# Patient Record
Sex: MALE | Race: OTHER
[De-identification: names, ages, dates, MRNs, and addresses within clinical notes are randomized per-mention and may not be internally consistent; named-entity substitution may affect disease eponyms.]

---

## 2018-07-31 ENCOUNTER — HOSPITAL ENCOUNTER (EMERGENCY)
Dept: HOSPITAL 8 - ED | Age: 53
Discharge: HOME | End: 2018-07-31
Payer: COMMERCIAL

## 2018-07-31 VITALS — SYSTOLIC BLOOD PRESSURE: 152 MMHG | DIASTOLIC BLOOD PRESSURE: 88 MMHG

## 2018-07-31 VITALS — BODY MASS INDEX: 26.99 KG/M2 | WEIGHT: 171.96 LBS | HEIGHT: 67 IN

## 2018-07-31 DIAGNOSIS — S01.112A: Primary | ICD-10-CM

## 2018-07-31 DIAGNOSIS — R55: ICD-10-CM
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
## 2018-07-31 DIAGNOSIS — G20: ICD-10-CM

## 2018-07-31 LAB
ALBUMIN SERPL-MCNC: 3.6 G/DL (ref 3.4–5)
ANION GAP SERPL CALC-SCNC: 6 MMOL/L (ref 5–15)
BASOPHILS # BLD AUTO: 0.07 X10^3/UL (ref 0–0.1)
BASOPHILS NFR BLD AUTO: 1 % (ref 0–1)
CALCIUM SERPL-MCNC: 8.9 MG/DL (ref 8.5–10.1)
CHLORIDE SERPL-SCNC: 101 MMOL/L (ref 98–107)
CREAT SERPL-MCNC: 1.21 MG/DL (ref 0.7–1.3)
CULTURE INDICATED?: NO
EOSINOPHIL # BLD AUTO: 0.21 X10^3/UL (ref 0–0.4)
EOSINOPHIL NFR BLD AUTO: 3 % (ref 1–7)
ERYTHROCYTE [DISTWIDTH] IN BLOOD BY AUTOMATED COUNT: 13.8 % (ref 9.4–14.8)
LYMPHOCYTES # BLD AUTO: 1.14 X10^3/UL (ref 1–3.4)
LYMPHOCYTES NFR BLD AUTO: 17 % (ref 22–44)
MCH RBC QN AUTO: 31.1 PG (ref 27.5–34.5)
MCHC RBC AUTO-ENTMCNC: 34.1 G/DL (ref 33.2–36.2)
MCV RBC AUTO: 91.4 FL (ref 81–97)
MD: NO
MICROSCOPIC: (no result)
MONOCYTES # BLD AUTO: 0.43 X10^3/UL (ref 0.2–0.8)
MONOCYTES NFR BLD AUTO: 6 % (ref 2–9)
NEUTROPHILS # BLD AUTO: 5.07 X10^3/UL (ref 1.8–6.8)
NEUTROPHILS NFR BLD AUTO: 73 % (ref 42–75)
PLATELET # BLD AUTO: 217 X10^3/UL (ref 130–400)
PMV BLD AUTO: 7.5 FL (ref 7.4–10.4)
RBC # BLD AUTO: 4.72 X10^6/UL (ref 4.38–5.82)
TROPONIN I SERPL-MCNC: < 0.015 NG/ML (ref 0–0.04)

## 2018-07-31 PROCEDURE — 99285 EMERGENCY DEPT VISIT HI MDM: CPT

## 2018-07-31 PROCEDURE — 93005 ELECTROCARDIOGRAM TRACING: CPT

## 2018-07-31 PROCEDURE — 36415 COLL VENOUS BLD VENIPUNCTURE: CPT

## 2018-07-31 PROCEDURE — 70450 CT HEAD/BRAIN W/O DYE: CPT

## 2018-07-31 PROCEDURE — 81003 URINALYSIS AUTO W/O SCOPE: CPT

## 2018-07-31 PROCEDURE — 85025 COMPLETE CBC W/AUTO DIFF WBC: CPT

## 2018-07-31 PROCEDURE — 82040 ASSAY OF SERUM ALBUMIN: CPT

## 2018-07-31 PROCEDURE — 80048 BASIC METABOLIC PNL TOTAL CA: CPT

## 2018-07-31 PROCEDURE — 84484 ASSAY OF TROPONIN QUANT: CPT

## 2018-07-31 PROCEDURE — 12051 INTMD RPR FACE/MM 2.5 CM/<: CPT

## 2018-08-06 ENCOUNTER — HOSPITAL ENCOUNTER (EMERGENCY)
Dept: HOSPITAL 8 - ED | Age: 53
Discharge: HOME | End: 2018-08-06
Payer: COMMERCIAL

## 2018-08-06 VITALS — HEIGHT: 67 IN | WEIGHT: 158.73 LBS | BODY MASS INDEX: 24.91 KG/M2

## 2018-08-06 VITALS — SYSTOLIC BLOOD PRESSURE: 133 MMHG | DIASTOLIC BLOOD PRESSURE: 80 MMHG
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
## 2018-08-06 DIAGNOSIS — S01.112D: Primary | ICD-10-CM

## 2018-08-06 PROCEDURE — 99282 EMERGENCY DEPT VISIT SF MDM: CPT

## 2020-06-09 ENCOUNTER — HOSPITAL ENCOUNTER (OUTPATIENT)
Dept: HOSPITAL 8 - CFH | Age: 55
Discharge: HOME | End: 2020-06-09
Attending: INTERNAL MEDICINE
Payer: COMMERCIAL

## 2020-06-09 DIAGNOSIS — R07.89: ICD-10-CM

## 2020-06-09 DIAGNOSIS — I35.1: Primary | ICD-10-CM

## 2020-06-09 DIAGNOSIS — I10: ICD-10-CM

## 2020-06-09 PROCEDURE — 93306 TTE W/DOPPLER COMPLETE: CPT

## 2020-06-09 PROCEDURE — 93017 CV STRESS TEST TRACING ONLY: CPT

## 2020-06-09 PROCEDURE — 78452 HT MUSCLE IMAGE SPECT MULT: CPT

## 2020-06-09 PROCEDURE — A9502 TC99M TETROFOSMIN: HCPCS

## 2020-12-22 ENCOUNTER — TELEPHONE (OUTPATIENT)
Dept: SCHEDULING | Facility: IMAGING CENTER | Age: 55
End: 2020-12-22

## 2021-01-25 ENCOUNTER — TELEPHONE (OUTPATIENT)
Dept: MEDICAL GROUP | Facility: PHYSICIAN GROUP | Age: 56
End: 2021-01-25

## 2021-01-25 RX ORDER — METOPROLOL SUCCINATE 50 MG/1
50 TABLET, EXTENDED RELEASE ORAL
COMMUNITY
Start: 2020-11-24 | End: 2021-05-25

## 2021-01-25 NOTE — TELEPHONE ENCOUNTER
NEW PATIENT VISIT PRE-VISIT PLANNING    1.  EpicCare Patient is checked in Patient Demographics?Yes    2.  Immunizations were updated in Epic using Reconcile Outside Information activity? Yes         3.  Is this appointment scheduled as a Hospital Follow-Up? No    4.  Patient is due for the following Health Maintenance Topics:   Health Maintenance Due   Topic Date Due   • HEPATITIS C SCREENING  1965   • COLONOSCOPY  08/28/2014   • IMM ZOSTER VACCINES (1 of 2) 08/28/2014   • IMM INFLUENZA (1) 09/01/2020       - Patient has completed Colonoscopy/FIT and HMR Letter faxed to:  [facility] .    5.  Reviewed/Updated the following with patient:       •   Preferred Pharmacy? Yes       •   Preferred Lab? Yes       •   Preferred Communication? Yes       •   Allergies? Yes       •   Medications? YES. Was Abstract Encounter opened and chart updated? YES       •   Social History? Yes       •   Family History (document living status of immediate family members and if + hx of  cancer, diabetes, hypertension, hyperlipidemia, heart attack, stroke) Yes    6.  Updated Care Team?       •   DME Company (gait device, O2, CPAP, etc.) N\A       •   Other Specialists (eye doctor, derm, GYN, cardiology, endo, etc): N\A    7.  AHA (Puls8) form printed for Provider? N/A

## 2021-01-25 NOTE — LETTER
[facility]  Remedios Anderson M.D.  [address]  [place] [address]  Fax: [phone]   Authorization for Release/Disclosure of   Protected Health Information   Name: VANCE ALCARAZ : 1965 SSN: [id number]   Address: [address] [place] [address] [address]  [place] [address] Phone:    [phone] (home)    I authorize the entity listed below to release/disclose the PHI below to:   [facility]/Remedios Anderson M.D. and Remedios Anderson M.D.   Provider or Entity Name:  [facility]    Address   City, State, [facility]   Phone:      Fax:  [phone]   Reason for request: continuity of care   Information to be released:    [  ] LAST COLONOSCOPY,  including any PATH REPORT and follow-up  [  ] LAST FIT/COLOGUARD RESULT [  ] LAST DEXA  [  ] LAST MAMMOGRAM  [  ] LAST PAP  [  ] LAST LABS [  ] RETINA EXAM REPORT  [  ] IMMUNIZATION RECORDS  [xxx  ] Release all info      [  ] Check here and initial the line next to each item to release ALL health information INCLUDING  _____ Care and treatment for drug and / or alcohol abuse  _____ HIV testing, infection status, or AIDS  _____ Genetic Testing    DATES OF SERVICE OR TIME PERIOD TO BE DISCLOSED: _____________  I understand and acknowledge that:  * This Authorization may be revoked at any time by you in writing, except if your health information has already been used or disclosed.  * Your health information that will be used or disclosed as a result of you signing this authorization could be re-disclosed by the recipient. If this occurs, your re-disclosed health information may no longer be protected by State or Federal laws.  * You may refuse to sign this Authorization. Your refusal will not affect your ability to obtain treatment.  * This Authorization becomes effective upon signing and will  on (date) __________.      If no date is indicated, this Authorization will  one (1) year from the signature date.    Name: Vance Alcaraz       Date:     2021            PLEASE FAX REQUESTED RECORDS BACK TO: [phone]

## 2021-01-26 ENCOUNTER — OFFICE VISIT (OUTPATIENT)
Dept: MEDICAL GROUP | Facility: PHYSICIAN GROUP | Age: 56
End: 2021-01-26
Payer: COMMERCIAL

## 2021-01-26 VITALS
HEIGHT: 67 IN | SYSTOLIC BLOOD PRESSURE: 128 MMHG | HEART RATE: 78 BPM | BODY MASS INDEX: 26.23 KG/M2 | OXYGEN SATURATION: 93 % | WEIGHT: 167.11 LBS | DIASTOLIC BLOOD PRESSURE: 80 MMHG | TEMPERATURE: 96.7 F

## 2021-01-26 DIAGNOSIS — G20.A1 PARKINSON DISEASE (HCC): ICD-10-CM

## 2021-01-26 DIAGNOSIS — R60.0 BILATERAL LOWER EXTREMITY EDEMA: ICD-10-CM

## 2021-01-26 DIAGNOSIS — Z12.5 SCREENING FOR PROSTATE CANCER: ICD-10-CM

## 2021-01-26 DIAGNOSIS — I10 BENIGN ESSENTIAL HTN: ICD-10-CM

## 2021-01-26 DIAGNOSIS — R73.03 PREDIABETES: ICD-10-CM

## 2021-01-26 PROCEDURE — 99204 OFFICE O/P NEW MOD 45 MIN: CPT | Performed by: FAMILY MEDICINE

## 2021-01-26 ASSESSMENT — PATIENT HEALTH QUESTIONNAIRE - PHQ9: CLINICAL INTERPRETATION OF PHQ2 SCORE: 0

## 2021-01-26 NOTE — LETTER
[facility]  Remedios Anderson M.D.  [address]  [place] [address]  Fax: [phone]   Authorization for Release/Disclosure of   Protected Health Information   Name: VANCE ALCARAZ : 1965 SSN: [id number]   Address: [address] [place] [address] [address]  [place] [address] Phone:    [phone] (home)    I authorize the entity listed below to release/disclose the PHI below to:   [facility]/Remedios Anderson M.D. and Remedios Anderson M.D.   Provider or Entity Name:    [facility]   Address   City, State, Zip   Phone:      Fax:     Reason for request: continuity of care   Information to be released:    [ x ] LAST COLONOSCOPY,  including any PATH REPORT and follow-up  [  ] LAST FIT/COLOGUARD RESULT [  ] LAST DEXA  [  ] LAST MAMMOGRAM  [  ] LAST PAP  [  ] LAST LABS [  ] RETINA EXAM REPORT  [  ] IMMUNIZATION RECORDS  [  ] Release all info      [  ] Check here and initial the line next to each item to release ALL health information INCLUDING  _____ Care and treatment for drug and / or alcohol abuse  _____ HIV testing, infection status, or AIDS  _____ Genetic Testing    DATES OF SERVICE OR TIME PERIOD TO BE DISCLOSED: _____________  I understand and acknowledge that:  * This Authorization may be revoked at any time by you in writing, except if your health information has already been used or disclosed.  * Your health information that will be used or disclosed as a result of you signing this authorization could be re-disclosed by the recipient. If this occurs, your re-disclosed health information may no longer be protected by State or Federal laws.  * You may refuse to sign this Authorization. Your refusal will not affect your ability to obtain treatment.  * This Authorization becomes effective upon signing and will  on (date) __________.      If no date is indicated, this Authorization will  one (1) year from the signature date.    Name: Vance Alcaraz    Signature:   Date:     2021       PLEASE FAX REQUESTED RECORDS BACK TO: [phone]

## 2021-01-26 NOTE — LETTER
[facility]  Remedios Anderson M.D.  [address]  [place] [address]  Fax: [phone]   Authorization for Release/Disclosure of   Protected Health Information   Name: VANCE ALCARAZ : 1965 SSN: [id number]   Address: [address] [place] [address] [address]  [place] [address] Phone:    [phone] (home)    I authorize the entity listed below to release/disclose the PHI below to:   [facility]/Remedios Anderson M.D. and Remedios Anderson M.D.   Provider or Entity Name:    Dr. Rinaldi-Neurologist   Address   City, [facility], [facility]   Phone:      Fax:     Reason for request: continuity of care   Information to be released:    [  ] LAST COLONOSCOPY,  including any PATH REPORT and follow-up  [  ] LAST FIT/COLOGUARD RESULT [  ] LAST DEXA  [  ] LAST MAMMOGRAM  [  ] LAST PAP  [  ] LAST LABS [  ] RETINA EXAM REPORT  [  ] IMMUNIZATION RECORDS  [  ] Release all info      [  ] Check here and initial the line next to each item to release ALL health information INCLUDING  _____ Care and treatment for drug and / or alcohol abuse  _____ HIV testing, infection status, or AIDS  _____ Genetic Testing    DATES OF SERVICE OR TIME PERIOD TO BE DISCLOSED: _____________  I understand and acknowledge that:  * This Authorization may be revoked at any time by you in writing, except if your health information has already been used or disclosed.  * Your health information that will be used or disclosed as a result of you signing this authorization could be re-disclosed by the recipient. If this occurs, your re-disclosed health information may no longer be protected by State or Federal laws.  * You may refuse to sign this Authorization. Your refusal will not affect your ability to obtain treatment.  * This Authorization becomes effective upon signing and will  on (date) __________.      If no date is indicated, this Authorization will  one (1) year from the signature date.    Name: Vance Alcaraz    Signature:   Date:      1/26/2021       PLEASE FAX REQUESTED RECORDS BACK TO: [phone]

## 2021-01-26 NOTE — LETTER
[facility]  Remedios Anderson M.D.  [address]  [place] [address]  Fax: [phone]   Authorization for Release/Disclosure of   Protected Health Information   Name: VANCE ALCARAZ : 1965 SSN: [id number]   Address: [address] [place] [address] [address]  [place] [address] Phone:    [phone] (home)    I authorize the entity listed below to release/disclose the PHI below to:   [facility]/Remedios Anderson M.D. and Remedios Anderson M.D.   Provider or Entity Name:    [facility]   Address   City, State, Zip   Phone:      Fax:     Reason for request: continuity of care   Information to be released:    [  ] LAST COLONOSCOPY,  including any PATH REPORT and follow-up  [  ] LAST FIT/COLOGUARD RESULT [  ] LAST DEXA  [  ] LAST MAMMOGRAM  [  ] LAST PAP  [  ] LAST LABS [  ] RETINA EXAM REPORT  [  ] IMMUNIZATION RECORDS  [  ] Release all info      [  ] Check here and initial the line next to each item to release ALL health information INCLUDING  _____ Care and treatment for drug and / or alcohol abuse  _____ HIV testing, infection status, or AIDS  _____ Genetic Testing    DATES OF SERVICE OR TIME PERIOD TO BE DISCLOSED: _____________  I understand and acknowledge that:  * This Authorization may be revoked at any time by you in writing, except if your health information has already been used or disclosed.  * Your health information that will be used or disclosed as a result of you signing this authorization could be re-disclosed by the recipient. If this occurs, your re-disclosed health information may no longer be protected by State or Federal laws.  * You may refuse to sign this Authorization. Your refusal will not affect your ability to obtain treatment.  * This Authorization becomes effective upon signing and will  on (date) __________.      If no date is indicated, this Authorization will  one (1) year from the signature date.    Name: Vance Alcaraz    Signature:   Date:     2021         PLEASE FAX REQUESTED RECORDS BACK TO: [phone]

## 2021-01-26 NOTE — LETTER
[email or web address]  Remedios Anderson M.D.  [address]  [place] [address]  Fax: [phone]   Authorization for Release/Disclosure of   Protected Health Information   Name: VANCE ALCARAZ : 1965 SSN: [id number]   Address: [address] [place] [address] [address]  [place] [address] Phone:    [phone] (home)    I authorize the entity listed below to release/disclose the PHI below to:   [facility]/Remedios Anderson M.D. and Remedios Anderson M.D.   Provider or Entity Name:    [facility]    Address   City, [facility], [facility]   Phone:      Fax:     Reason for request: continuity of care   Information to be released:    [  ] LAST COLONOSCOPY,  including any PATH REPORT and follow-up  [  ] LAST FIT/COLOGUARD RESULT [  ] LAST DEXA  [  ] LAST MAMMOGRAM  [  ] LAST PAP  [  ] LAST LABS [  ] RETINA EXAM REPORT  [  ] IMMUNIZATION RECORDS  [  ] Release all info      [  ] Check here and initial the line next to each item to release ALL health information INCLUDING  _____ Care and treatment for drug and / or alcohol abuse  _____ HIV testing, infection status, or AIDS  _____ Genetic Testing    DATES OF SERVICE OR TIME PERIOD TO BE DISCLOSED: _____________  I understand and acknowledge that:  * This Authorization may be revoked at any time by you in writing, except if your health information has already been used or disclosed.  * Your health information that will be used or disclosed as a result of you signing this authorization could be re-disclosed by the recipient. If this occurs, your re-disclosed health information may no longer be protected by State or Federal laws.  * You may refuse to sign this Authorization. Your refusal will not affect your ability to obtain treatment.  * This Authorization becomes effective upon signing and will  on (date) __________.      If no date is indicated, this Authorization will  one (1) year from the signature date.    Name: Vance Alcaraz    Signature:   Date:     2021       PLEASE FAX REQUESTED RECORDS BACK TO: [phone]

## 2021-01-26 NOTE — PROGRESS NOTES
Subjective:      Nadir Alcaraz is a 55 y.o. male who presents with New Patient (N2U/[place])            HPI     This is a 55-year-old Greek male who is here as a new patient to get established with me.  He moved from [place] 8 years ago.  He was previously under the care of [facility] physicians.    He was diagnosed with Parkinson's disease when he was still living in [place] in 2011.  He is currently under the care of Dr. Rinaldi, neurologist.  He is on 3 medications which are rasagiline, carbidopa-levodopa and amantadine.  He said he follows up with Dr. Rinaldi only once a year.  He has been stable on his current medications.  He works as a  for a company that makes [employer].  He said even with his Parkinson's disease, he is still able to perform his job.  He has tremors and stiffness in his extremities but he does not need any assistive device for ambulation.  He said there are times when he has problems with swallowing.    He has hypertension and this is currently under control on his medications which are metoprolol and losartan/HCTZ without side effects.    He has chronic bilateral lower extremity edema and he states that he has been worked up by the cardiologist Dr. Mccann to see if this is heart related but all the tests came back no evidence of heart disease.    He has history of prediabetes which is managed with watching his diet.  He said his last blood work was about 2 years ago because he was not able to have a follow-up all year last year because of the COVID-19 pandemic.    He said he already had his colon[facility] within the last 8 [facility].     I reviewed the following    Past Medical History:   Diagnosis Date   • Benign essential HTN    • Parkinson disease (HCC)    • Prediabetes         History reviewed. No pertinent surgical history.    No Known Allergies    Current Outpatient Medications   Medication Sig Dispense Refill   • metoprolol  SR (TOPROL XL) 50 MG TABLET SR 24 HR Take 50 mg by mouth every day.     • rasagiline (AZILECT) 1 MG Tab Take 1 mg by mouth every day.     • carbidopa-levodopa (SINEMET)  MG Tab Take 1 Tab by mouth 4 times a day.     • losartan-hydrochlorothiazide (HYZAAR) 100-12.5 MG per tablet Take 1 Tab by mouth every day.     • amantadine (SYMMETREL) 100 MG Cap Take 100 mg by mouth 3 times a day.     • aspirin 81 MG EC tablet Take  by mouth.       No current facility-administered medications for this visit.         Family History   Problem Relation Age of Onset   • Alzheimer's Disease Mother    • Stroke Father    • No Known Problems Brother    • No Known Problems Brother    • No Known Problems Brother        Social History     Socioeconomic History   • Marital status:      Spouse name: Not on file   • Number of children: 2   • Years of education: Not on file   • Highest education level: Not on file   Occupational History   • Occupation: [employer] - senior    Social Needs   • Financial resource strain: Not on file   • Food insecurity     Worry: Not on file     Inability: Not on file   • Transportation needs     Medical: Not on file     Non-medical: Not on file   Tobacco Use   • Smoking status: Former Smoker     Packs/day: 0.25     Years: 4.00     Pack years: 1.00     Quit date: 1981     Years since quittin.0   • Smokeless tobacco: Never Used   • Tobacco comment: TEENAGER -    Substance and Sexual Activity   • Alcohol use: Not Currently     Comment: used to drink occasionally   • Drug use: Not Currently     Types: Inhaled     Comment: used marijuana in [place]   • Sexual activity: Yes     Partners: Female   Lifestyle   • Physical activity     Days per week: Not on file     Minutes per session: Not on file   • Stress: Not on file   Relationships   • Social connections     Talks on phone: Not on file     Gets together: Not on file     Attends [employer] service: Not on file     Active member of  "club or organization: Not on file     Attends meetings of clubs or organizations: Not on file     Relationship status: Not on file   • Intimate partner violence     Fear of current or ex partner: Not on file     Emotionally abused: Not on file     Physically abused: Not on file     Forced sexual activity: Not on file   Other Topics Concern   • Not on file   Social History Narrative   • Not on file               ROS      As per HPI, the rest are negative.     Objective:     /80 (BP Location: Left arm, Patient Position: Sitting, BP Cuff Size: Adult)   Pulse 78   Temp 35.9 °C (96.7 °F) (Temporal)   Ht 1.702 m (5' 7\")   Wt 75.8 kg (167 lb 1.7 oz)   SpO2 93%   BMI 26.17 kg/m²      Physical Exam  Vitals signs and nursing note reviewed.   Constitutional:       General: He is not in acute distress.     Appearance: He is well-developed. He is not diaphoretic.   HENT:      Head: Normocephalic and atraumatic.      Right Ear: External ear normal.      Left Ear: External ear normal.      Nose: Nose normal.      Mouth/Throat:      Pharynx: No oropharyngeal exudate.   Eyes:      General: No scleral icterus.        Right eye: No discharge.         Left eye: No discharge.      Conjunctiva/sclera: Conjunctivae normal.      Pupils: Pupils are equal, round, and reactive to light.   Neck:      Musculoskeletal: Normal range of motion and neck supple.      Thyroid: No thyromegaly.      Vascular: No JVD.      Trachea: No tracheal deviation.   Cardiovascular:      Rate and Rhythm: Normal rate and regular rhythm.      Heart sounds: Normal heart sounds. No murmur. No friction rub. No gallop.    Pulmonary:      Effort: Pulmonary effort is normal. No respiratory distress.      Breath sounds: Normal breath sounds. No stridor. No wheezing or rales.   Chest:      Chest wall: No tenderness.   Abdominal:      General: Bowel sounds are normal. There is no distension.      Palpations: Abdomen is soft. There is no mass.      Tenderness: " There is no abdominal tenderness. There is no guarding or rebound.      Hernia: No hernia is present.   Musculoskeletal: Normal range of motion.         General: No tenderness or deformity.      Left lower leg: Edema (1+ pitting edema of the distal legs) present.      Comments: Venous stasis discoloration of the legs   Lymphadenopathy:      Cervical: No cervical adenopathy.   Skin:     General: Skin is warm and dry.      Coloration: Skin is not pale.      Findings: No erythema or rash.   Neurological:      Mental Status: He is alert and oriented to person, place, and time.      Cranial Nerves: No cranial nerve deficit.      Motor: No abnormal muscle tone.      Coordination: Coordination normal.      Deep Tendon Reflexes: Reflexes are normal and symmetric. Reflexes normal.      Comments: Positive tremors both hands, positive cogwheel rigidity of the upper and lower extremities   Psychiatric:         Behavior: Behavior normal.         Thought Content: Thought content normal.         Judgment: Judgment normal.                      Assessment/Plan:        1. Parkinson disease (HCC)  He seems to be stable and under control on all the meds.  Continue meds.  I will get records from his neurologist Dr. Rinaldi.  - Lipid Profile; Future  - Comp Metabolic Panel; Future  - HEMOGLOBIN A1C; Future  - CBC WITH DIFFERENTIAL; Future  - PROSTATE SPECIFIC AG SCREENING; Future    2. Benign essential HTN  Controlled on his medications.  Continue the same meds.  We will do updated blood work.  - Lipid Profile; Future  - Comp Metabolic Panel; Future  - HEMOGLOBIN A1C; Future  - CBC WITH DIFFERENTIAL; Future  - PROSTATE SPECIFIC AG SCREENING; Future    3. Prediabetes  Continue to avoid sweets and decrease carbs.  We will do updated hemoglobin A1c and fasting blood sugar.  - Lipid Profile; Future  - Comp Metabolic Panel; Future  - HEMOGLOBIN A1C; Future  - CBC WITH DIFFERENTIAL; Future  - PROSTATE SPECIFIC AG SCREENING; Future    4.  Bilateral lower extremity edema  I will get records from the cardiologist Dr. Mccann who did work-up for the bilateral lower extremity edema but according to the patient no cardiac origin was found.    5. Screening for prostate cancer  We will get screening PSA.  - Lipid Profile; Future  - Comp Metabolic Panel; Future  - HEMOGLOBIN A1C; Future  - CBC WITH DIFFERENTIAL; Future  - PROSTATE SPECIFIC AG SCREENING; Future    We will get records from previous PCP, previous colonoscopy, cardiology records as well as records from the neurologist.    Follow-up in 3 months.      Please note that this dictation was created using voice recognition software. I have worked with consultants from the vendor as well as technical experts from [facility]  [employer] to optimize the interface. I have made every reasonable attempt to correct obvious errors, but I expect that there are errors of grammar and possibly content I did not discover before finalizing the note.

## 2021-01-26 NOTE — LETTER
[facility]  Remedios Anderson M.D.  [address]  [place] [address]  Fax: [phone]   Authorization for Release/Disclosure of   Protected Health Information   Name: VANCE ALCARAZ : 1965 SSN: [id number]   Address: [address] [place] [address] [address]  [place] [address] Phone:    [phone] (home)    I authorize the entity listed below to release/disclose the PHI below to:   [facility]/Remedios Anderson M.D. and Remedios Anderson M.D.   Provider or Entity Name:     Address   City, State, Zip   Phone:      Fax:     Reason for request: continuity of care   Information to be released:    [  ] LAST COLONOSCOPY,  including any PATH REPORT and follow-up  [  ] LAST FIT/COLOGUARD RESULT [  ] LAST DEXA  [  ] LAST MAMMOGRAM  [  ] LAST PAP  [  ] LAST LABS [  ] RETINA EXAM REPORT  [  ] IMMUNIZATION RECORDS  [  ] Release all info      [  ] Check here and initial the line next to each item to release ALL health information INCLUDING  _____ Care and treatment for drug and / or alcohol abuse  _____ HIV testing, infection status, or AIDS  _____ Genetic Testing    DATES OF SERVICE OR TIME PERIOD TO BE DISCLOSED: _____________  I understand and acknowledge that:  * This Authorization may be revoked at any time by you in writing, except if your health information has already been used or disclosed.  * Your health information that will be used or disclosed as a result of you signing this authorization could be re-disclosed by the recipient. If this occurs, your re-disclosed health information may no longer be protected by State or Federal laws.  * You may refuse to sign this Authorization. Your refusal will not affect your ability to obtain treatment.  * This Authorization becomes effective upon signing and will  on (date) __________.      If no date is indicated, this Authorization will  one (1) year from the signature date.    Name: Vance Alcaraz    Signature:   Date:     2021       PLEASE FAX REQUESTED  RECORDS BACK TO: [phone]

## 2021-03-15 DIAGNOSIS — Z23 NEED FOR VACCINATION: ICD-10-CM

## 2021-04-26 ENCOUNTER — OFFICE VISIT (OUTPATIENT)
Dept: MEDICAL GROUP | Facility: PHYSICIAN GROUP | Age: 56
End: 2021-04-26
Payer: COMMERCIAL

## 2021-04-26 VITALS
HEART RATE: 83 BPM | SYSTOLIC BLOOD PRESSURE: 136 MMHG | DIASTOLIC BLOOD PRESSURE: 70 MMHG | OXYGEN SATURATION: 97 % | HEIGHT: 67 IN | TEMPERATURE: 98.1 F | WEIGHT: 168.87 LBS | BODY MASS INDEX: 26.51 KG/M2

## 2021-04-26 DIAGNOSIS — I10 ESSENTIAL HYPERTENSION: ICD-10-CM

## 2021-04-26 DIAGNOSIS — R60.0 BILATERAL LEG EDEMA: ICD-10-CM

## 2021-04-26 DIAGNOSIS — G20.A1 PARKINSON DISEASE (HCC): ICD-10-CM

## 2021-04-26 DIAGNOSIS — R73.03 PREDIABETES: ICD-10-CM

## 2021-04-26 DIAGNOSIS — E78.5 DYSLIPIDEMIA: ICD-10-CM

## 2021-04-26 PROCEDURE — 99214 OFFICE O/P EST MOD 30 MIN: CPT | Performed by: FAMILY MEDICINE

## 2021-04-26 NOTE — PROGRESS NOTES
Subjective:      Nadir Alcaraz is a 55 y.o. male who presents with Hypertension            HPI     Patient returns for follow-up of his medical problems.  He is accompanied by his wife.    In terms of his hypertension, he continues to take metoprolol, and losartan/HCTZ with good control of the blood pressure.    He has been managing his dyslipidemia with his own efforts.    He has impaired fasting blood sugar and he manages this by watching his diet.  He is limited with his mobility because of the Parkinson's disease.    He continues to take his medications for Parkinson's disease which are carbidopa-levodopa and rasagiline.  He is followed by his neurologist Dr. Rinaldi.  He only goes once a year for follow-up.  There are times when he has significant stiffness that he cannot move.  Otherwise he is still able to do his job as a .  He said he can still drawl without difficulty.  He works from home.  Wife said he needs assistance at times with certain ADLs when he becomes stiff and unable to move.  The wife is doing all the driving.  His neurologist noted that if patient decides apply for permanent disability that he will be happy to assist him.  Patient states that he wants to continue working for as long as he can.    He continues to have lower extremity edema.  Wife thinks this is because his mobility is limited.  He had cardiac work-up done by Dr. Mccann in 2020 due to chest pain with negative nuclear stress test for ischemia or infarction and normal EF and no significant valvular abnormality when he had an echocardiogram done.    He needs COVID-19 vaccination.    Past medical history, past surgical history, family history reviewed-no changes    Social history reviewed-no changes    Allergies reviewed-no changes    Medications reviewed-no changes    ROS    as per HPI, the rest are negative.     Objective:     /70 (BP Location: Left arm, Patient Position: Sitting, BP Cuff Size: Adult)    "Pulse 83   Temp 36.7 °C (98.1 °F) (Temporal)   Ht 1.702 m (5' 7\")   Wt 76.6 kg (168 lb 14 oz)   SpO2 97%   BMI 26.45 kg/m²      Physical Exam     Examined alert, awake, oriented, not in distress    Neck-supple, no lymphadenopathy, no thyromegaly  Lungs-clear to auscultation, no rales, no wheezes  Heart-regular rate and rhythm, no murmur  Extremities-1+ pitting edema of the legs, no clubbing, no cyanosis    Labs done from [facility] on 4/12/2021    Total cholesterol 175, HDL 53, triglycerides 70,  fasting blood glucose 120, hemoglobin A1c 6.2    CMP within normal limits    PSA 1.7    CBC within normal limits            Assessment/Plan:        1. Essential hypertension   Under acceptable control.  Continue meds.  Advised watching the salt intake, try to increase activity and keep a healthy weight.  - Lipid Profile; Future  - Comp Metabolic Panel; Future  - HEMOGLOBIN A1C; Future    2. Prediabetes  Fasting blood glucose and hemoglobin A1c are still borderline elevated consistent with prediabetes.  Continue to avoid sweets and decrease carbs.  - Lipid Profile; Future  - Comp Metabolic Panel; Future  - HEMOGLOBIN A1C; Future    3. Dyslipidemia  Calculated 10-year ASCVD risk score is at 6.5%.  Advised to avoid fatty foods, eat more fruits and vegetables, try to have fish 3 times a week, try to increase activity and keep a healthy weight.  Recheck blood work in 6 months.  - Lipid Profile; Future  - Comp Metabolic Panel; Future  - HEMOGLOBIN A1C; Future    4. Parkinson disease (HCC)  Symptoms are stable, they are not worse or better.  He follows up with Dr. Rinaldi once a year only.  Continue meds.  - Lipid Profile; Future  - Comp Metabolic Panel; Future  - HEMOGLOBIN A1C; Future    5. Bilateral leg edema  Most likely from dependent edema because he is not as active and not moving as much as it should.  He has already been worked up for CAD with negative results when he had nuclear stress test and " echocardiogram that came back normal EF and no significant valvular problems in 2020.  I given prescription for compression stockings knee-high to use during the day.  Advised to elevate the legs with pillows when he is lying in bed at night.  - Lipid Profile; Future  - Comp Metabolic Panel; Future  - HEMOGLOBIN A1C; Future     follow-up in 6 months.    Advised to get COVID-19 vaccination.  There should not be any contraindication.  We will give her the flu shot this coming fall.      Please note that this dictation was created using voice recognition software. I have worked with consultants from the vendor as well as technical experts from [facility]  [employer] to optimize the interface. I have made every reasonable attempt to correct obvious errors, but I expect that there are errors of grammar and possibly content I did not discover before finalizing the note.

## 2021-05-25 RX ORDER — METOPROLOL SUCCINATE 50 MG/1
TABLET, EXTENDED RELEASE ORAL
Qty: 90 TABLET | Refills: 1 | Status: SHIPPED | OUTPATIENT
Start: 2021-05-25 | End: 2021-10-25 | Stop reason: SDUPTHER

## 2021-07-19 ENCOUNTER — TELEPHONE (OUTPATIENT)
Dept: SCHEDULING | Age: 56
End: 2021-07-19

## 2021-10-15 LAB — HBA1C MFR BLD: 6.3 % (ref 0–5.6)

## 2021-10-25 ENCOUNTER — OFFICE VISIT (OUTPATIENT)
Dept: MEDICAL GROUP | Facility: PHYSICIAN GROUP | Age: 56
End: 2021-10-25
Payer: COMMERCIAL

## 2021-10-25 VITALS
BODY MASS INDEX: 27.37 KG/M2 | HEART RATE: 88 BPM | SYSTOLIC BLOOD PRESSURE: 130 MMHG | WEIGHT: 174.38 LBS | HEIGHT: 67 IN | DIASTOLIC BLOOD PRESSURE: 80 MMHG | OXYGEN SATURATION: 95 % | TEMPERATURE: 98.6 F

## 2021-10-25 DIAGNOSIS — I10 ESSENTIAL HYPERTENSION: ICD-10-CM

## 2021-10-25 DIAGNOSIS — G20.A1 PARKINSON DISEASE (HCC): ICD-10-CM

## 2021-10-25 DIAGNOSIS — E78.5 DYSLIPIDEMIA: ICD-10-CM

## 2021-10-25 DIAGNOSIS — R60.0 BILATERAL LEG EDEMA: ICD-10-CM

## 2021-10-25 DIAGNOSIS — R73.03 PREDIABETES: ICD-10-CM

## 2021-10-25 PROCEDURE — 99214 OFFICE O/P EST MOD 30 MIN: CPT | Performed by: FAMILY MEDICINE

## 2021-10-25 RX ORDER — METOPROLOL SUCCINATE 50 MG/1
50 TABLET, EXTENDED RELEASE ORAL
Qty: 90 TABLET | Refills: 1 | Status: SHIPPED | OUTPATIENT
Start: 2021-10-25 | End: 2022-04-25

## 2021-10-25 RX ORDER — LOSARTAN POTASSIUM AND HYDROCHLOROTHIAZIDE 12.5; 1 MG/1; MG/1
1 TABLET ORAL DAILY
Qty: 90 TABLET | Refills: 1 | Status: SHIPPED | OUTPATIENT
Start: 2021-10-25 | End: 2022-04-20

## 2021-10-25 NOTE — PROGRESS NOTES
Subjective     Nadir Alcaraz is a 56 y.o. male who presents with Hypertension            HPI     Patient is here for follow-up of his medical problems but he is accompanied by his wife.  He wants me to know that he is going to apply for permanent disability due to his Parkinson's disease.  He is a  and he has more difficulty doing tasks with his hands such as drawing.  He said he already told his neurologist Dr. Rinaldi about this who will help with his application for disability.  He and his wife will moved to [place] where they were originally from so they can be close to their children and grandkids.  They are planning to move before Thanksgiving which is less than a month from now.    In terms of his hypertension, he continues to take both losartan/HCTZ and metoprolol XL with good control of his blood pressure without side effects.    We follow him for dyslipidemia and he is managing this with his own efforts only.    We also follow him for prediabetes and he is managing this by watching his diet.  He has gained 6 pounds since the last visit.    He continues to take his Parkinson's medications which are carbidopa/levodopa, rasagiline, amantadine.  As mentioned above there is worsening of his tremors in his hands difficulty with his work as a .  He is working on getting permanent disability.    For bilateral lower extremity edema, he said this is improved.  As mentioned before, cardiac work-up done by Dr. Mccann in 2020 with a nuclear stress test came back no evidence of ischemia or infarction and normal EF, echocardiogram no significant valvular abnormality.  This is thought to be from decreased mobility due to his Parkinson's disease.    He is due for flu shot and Shingrix vaccine but he declined to get these shots.    Past medical history, past surgical history, family history reviewed-no changes    Social history reviewed-no changes    Allergies reviewed-no  "changes    Medications reviewed-no changes    ROS     As per HPI, the rest are negative.           Objective     /80 (BP Location: Left arm, Patient Position: Sitting, BP Cuff Size: Adult)   Pulse 88   Temp 37 °C (98.6 °F) (Temporal)   Ht 1.702 m (5' 7\")   Wt 79.1 kg (174 lb 6.1 oz)   SpO2 95%   BMI 27.31 kg/m²      Physical Exam      Examined alert, awake, oriented, not in distress    Neck-supple, no lymphadenopathy, no thyromegaly  Lungs-clear to auscultation, no rales, no wheezes  Heart-regular rate and rhythm, no murmur  Extremities-no edema, clubbing, cyanosis           Abstract on 10/18/2021   Component Date Value Ref Range Status   • Glycohemoglobin 10/15/2021 6.3* 0.0 - 5.6 % Final     Labs done at [facility] on 10/15/2021    Total cholesterol 153, HDL 48, triglycerides 106, LDL 85    Fasting blood glucose 119    Hemoglobin A1c 6.3    The rest of the CMP within normal limits           Assessment & Plan        1. Essential hypertension  Controlled on his medications.  Refill sent to his pharmacy.  - metoprolol SR (TOPROL XL) 50 MG TABLET SR 24 HR; Take 1 Tablet by mouth every day.  Dispense: 90 Tablet; Refill: 1  - losartan-hydrochlorothiazide (HYZAAR) 100-12.5 MG per tablet; Take 1 Tablet by mouth every day.  Dispense: 90 Tablet; Refill: 1    2. Dyslipidemia  All at target with his own efforts only.    3. Prediabetes  Fasting blood sugar borderline elevated at 119 and hemoglobin A1c also borderline elevated at 6.3.  Advised avoidance of sweets and decreasing the carbs to avoid becoming diabetic.  Advise regular exercises and losing weight.    4. Parkinson disease (HCC)  There is worsening of his problem with Parkinson's disease causing difficulty with doing tasks with his hands which interferes with his work as a  therefore he is going to apply for permanent disability.  He and his wife are moving back to [place] to be with his family.  This is his last visit.    5. Bilateral leg " edema  Noncardiac.  Most likely from dependent edema.  No edema noted at this time.  Advised to keep himself active and to get up and move regularly.        Please note that this dictation was created using voice recognition software. I have worked with consultants from the vendor as well as technical experts from [facility] to optimize the interface. I have made every reasonable attempt to correct obvious errors, but I expect that there are errors of grammar and possibly content I did not discover before finalizing the note.

## 2022-04-19 DIAGNOSIS — I10 ESSENTIAL HYPERTENSION: ICD-10-CM

## 2022-04-20 RX ORDER — LOSARTAN POTASSIUM AND HYDROCHLOROTHIAZIDE 12.5; 1 MG/1; MG/1
1 TABLET ORAL DAILY
Qty: 90 TABLET | Refills: 0 | Status: SHIPPED | OUTPATIENT
Start: 2022-04-20